# Patient Record
Sex: FEMALE | Employment: FULL TIME | URBAN - METROPOLITAN AREA
[De-identification: names, ages, dates, MRNs, and addresses within clinical notes are randomized per-mention and may not be internally consistent; named-entity substitution may affect disease eponyms.]

---

## 2017-04-05 ENCOUNTER — OFFICE VISIT (OUTPATIENT)
Dept: FAMILY MEDICINE CLINIC | Facility: CLINIC | Age: 62
End: 2017-04-05

## 2017-04-05 VITALS
WEIGHT: 182.19 LBS | HEART RATE: 70 BPM | OXYGEN SATURATION: 97 % | RESPIRATION RATE: 18 BRPM | SYSTOLIC BLOOD PRESSURE: 124 MMHG | DIASTOLIC BLOOD PRESSURE: 86 MMHG | BODY MASS INDEX: 29.99 KG/M2 | TEMPERATURE: 99 F | HEIGHT: 65.25 IN

## 2017-04-05 DIAGNOSIS — H26.132 TOTAL TRAUMATIC CATARACT OF LEFT EYE: ICD-10-CM

## 2017-04-05 DIAGNOSIS — I10 ESSENTIAL HYPERTENSION: ICD-10-CM

## 2017-04-05 DIAGNOSIS — Z01.818 PREOP EXAMINATION: Primary | ICD-10-CM

## 2017-04-05 PROCEDURE — 99243 OFF/OP CNSLTJ NEW/EST LOW 30: CPT | Performed by: FAMILY MEDICINE

## 2017-04-05 NOTE — PROGRESS NOTES
Emmanuel Mills is a 63year old female who presents for preop clearance  Dr. Mirtha Kan reqesting clearance. Left cataract removal   4/17/17  At [facility] in [address]   HPI:   Pt complains of left eye vision changes.   Pt denies any vision  HEENT: denies nasal congestion, sinus pain or ST  LUNGS: denies shortness of breath with exertion  CARDIOVASCULAR: denies chest pain on exertion  GI: denies abdominal pain,denies heartburn  : denies nocturia or changes in stream  MUSCULOSKELETAL:

## 2017-05-18 ENCOUNTER — OFFICE VISIT (OUTPATIENT)
Dept: FAMILY MEDICINE CLINIC | Facility: CLINIC | Age: 62
End: 2017-05-18

## 2017-05-18 VITALS
SYSTOLIC BLOOD PRESSURE: 130 MMHG | RESPIRATION RATE: 16 BRPM | HEIGHT: 65.25 IN | WEIGHT: 184 LBS | OXYGEN SATURATION: 99 % | DIASTOLIC BLOOD PRESSURE: 80 MMHG | HEART RATE: 69 BPM | BODY MASS INDEX: 30.29 KG/M2 | TEMPERATURE: 98 F

## 2017-05-18 DIAGNOSIS — Z13.820 SCREENING FOR OSTEOPOROSIS: ICD-10-CM

## 2017-05-18 DIAGNOSIS — I10 ESSENTIAL HYPERTENSION: ICD-10-CM

## 2017-05-18 DIAGNOSIS — Z01.419 WELL WOMAN EXAM WITH ROUTINE GYNECOLOGICAL EXAM: Primary | ICD-10-CM

## 2017-05-18 DIAGNOSIS — Z12.31 ENCOUNTER FOR SCREENING MAMMOGRAM FOR HIGH-RISK PATIENT: ICD-10-CM

## 2017-05-18 PROCEDURE — 99396 PREV VISIT EST AGE 40-64: CPT | Performed by: FAMILY MEDICINE

## 2017-05-18 RX ORDER — HYDROCHLOROTHIAZIDE 12.5 MG/1
TABLET ORAL
Qty: 180 TABLET | Refills: 1 | Status: SHIPPED | OUTPATIENT
Start: 2017-05-18 | End: 2020-12-16

## 2017-05-18 NOTE — PROGRESS NOTES
HPI:-    Noemi Park is a 63year old female who presents for a complete physical exam.     Wt Readings from Last 6 Encounters:  05/18/17 : 184 lb  04/05/17 : 182 lb 3.2 oz  03/29/16 : 184 lb  05/23/13 : 179 lb 10.8 oz    Body mass index is 30.4 kg/(m^2 OF SYSTEMS:   GENERAL: feels well otherwise  SKIN: denies any unusual skin lesions  EYES:denies blurred vision or double vision  HEENT: denies nasal congestion, sinus pain or ST  LUNGS: denies shortness of breath with exertion  CARDIOVASCULAR: denies chest Metabolic Panel (14); Future    2. Encounter for screening mammogram for high-risk patient    - [facility] NIKI 2D+3D SCREENING BILAT (CPT=77067/11837); Future    3.  Essential hypertension    - hydrochlorothiazide 12.5 MG Oral Tab; TAKE 2 TABLETS(25 MG) BY MOUTH

## 2017-07-26 NOTE — PROGRESS NOTES
HPI:   Rosaura Cervantes is a 63year old female who presents for left ear feeling clogged for  2  weeks. Patient denies ear pain. Left ear is popping. Sounds are not muffled. Denies hearing loss or tinnitus.  Denies sore throat, nasal congestion, cough or col explained to the patient including remote chance of ruptured TM. Verbal consent obtained from the patient. Bilateral ears irrigated with warm water and hydrogen peroxide solution. Cerumen successfully removed.  TM clear and normal. Pt tolerated procedure we

## 2017-07-27 ENCOUNTER — OFFICE VISIT (OUTPATIENT)
Dept: FAMILY MEDICINE CLINIC | Facility: CLINIC | Age: 62
End: 2017-07-27

## 2017-07-27 VITALS
BODY MASS INDEX: 29.96 KG/M2 | SYSTOLIC BLOOD PRESSURE: 118 MMHG | DIASTOLIC BLOOD PRESSURE: 86 MMHG | WEIGHT: 182 LBS | TEMPERATURE: 98 F | RESPIRATION RATE: 18 BRPM | OXYGEN SATURATION: 97 % | HEART RATE: 72 BPM | HEIGHT: 65.25 IN

## 2017-07-27 DIAGNOSIS — H61.23 IMPACTED CERUMEN, BILATERAL: Primary | ICD-10-CM

## 2017-07-27 PROCEDURE — 99213 OFFICE O/P EST LOW 20 MIN: CPT | Performed by: PHYSICIAN ASSISTANT

## 2019-07-30 ENCOUNTER — TELEPHONE (OUTPATIENT)
Dept: FAMILY MEDICINE CLINIC | Facility: CLINIC | Age: 64
End: 2019-07-30

## 2020-12-16 ENCOUNTER — OFFICE VISIT (OUTPATIENT)
Dept: FAMILY MEDICINE CLINIC | Facility: CLINIC | Age: 65
End: 2020-12-16
Payer: COMMERCIAL

## 2020-12-16 VITALS
RESPIRATION RATE: 16 BRPM | SYSTOLIC BLOOD PRESSURE: 158 MMHG | HEART RATE: 125 BPM | HEIGHT: 66 IN | WEIGHT: 190 LBS | TEMPERATURE: 97 F | DIASTOLIC BLOOD PRESSURE: 90 MMHG | BODY MASS INDEX: 30.53 KG/M2 | OXYGEN SATURATION: 98 %

## 2020-12-16 DIAGNOSIS — Z13.820 SCREENING FOR OSTEOPOROSIS: ICD-10-CM

## 2020-12-16 DIAGNOSIS — Z12.31 ENCOUNTER FOR MAMMOGRAM TO ESTABLISH BASELINE MAMMOGRAM: ICD-10-CM

## 2020-12-16 DIAGNOSIS — I10 ESSENTIAL HYPERTENSION: ICD-10-CM

## 2020-12-16 DIAGNOSIS — Z00.00 ANNUAL PHYSICAL EXAM: ICD-10-CM

## 2020-12-16 DIAGNOSIS — Z01.419 WELL WOMAN EXAM WITH ROUTINE GYNECOLOGICAL EXAM: Primary | ICD-10-CM

## 2020-12-16 DIAGNOSIS — H61.23 BILATERAL IMPACTED CERUMEN: ICD-10-CM

## 2020-12-16 DIAGNOSIS — M25.50 ARTHRALGIA, UNSPECIFIED JOINT: ICD-10-CM

## 2020-12-16 DIAGNOSIS — M25.40 JOINT SWELLING: ICD-10-CM

## 2020-12-16 PROCEDURE — 3080F DIAST BP >= 90 MM HG: CPT | Performed by: FAMILY MEDICINE

## 2020-12-16 PROCEDURE — 99396 PREV VISIT EST AGE 40-64: CPT | Performed by: FAMILY MEDICINE

## 2020-12-16 PROCEDURE — 88175 CYTOPATH C/V AUTO FLUID REDO: CPT | Performed by: FAMILY MEDICINE

## 2020-12-16 PROCEDURE — 3077F SYST BP >= 140 MM HG: CPT | Performed by: FAMILY MEDICINE

## 2020-12-16 PROCEDURE — 69210 REMOVE IMPACTED EAR WAX UNI: CPT | Performed by: FAMILY MEDICINE

## 2020-12-16 PROCEDURE — 87624 HPV HI-RISK TYP POOLED RSLT: CPT | Performed by: FAMILY MEDICINE

## 2020-12-16 PROCEDURE — 3008F BODY MASS INDEX DOCD: CPT | Performed by: FAMILY MEDICINE

## 2020-12-16 RX ORDER — TRIAMTERENE AND HYDROCHLOROTHIAZIDE 37.5; 25 MG/1; MG/1
1 CAPSULE ORAL EVERY MORNING
Qty: 30 CAPSULE | Refills: 0 | Status: SHIPPED | OUTPATIENT
Start: 2020-12-16 | End: 2021-01-19

## 2020-12-16 RX ORDER — PREDNISONE 1 MG/1
TABLET ORAL
COMMUNITY
Start: 2020-11-10

## 2020-12-16 NOTE — PROGRESS NOTES
HPI:-    Nallely Valdez is a 60year old female who presents for a complete physical exam.     Wt Readings from Last 6 Encounters:  12/16/20 : 190 lb (86.2 kg)  07/27/17 : 182 lb (82.6 kg)  05/18/17 : 184 lb (83.5 kg)  04/05/17 : 182 lb 3.2 oz (82.6 kg)  0 Social History:   Social History    Tobacco Use      Smoking status: Former Smoker        Quit date: 3/29/1988        Years since quittin.7    Alcohol use:  Yes      Alcohol/week: 0.0 standard drinks    Drug use: No    Occ:  Single Children: 2  Insur genitalia - no inguinal LAD, no lesions. Speculum exam- introitus is normal,scant discharge,cervix is pink.  Bimanual exam- no adnexal masses or tenderness  ((RECTAL:good rectal tone,no mass, brown stool, stool is OB negative)) deferred c-scope 7/2020  MUS morning. Dispense: 30 capsule; Refill: 0    8. Bilateral impacted cerumen  S/p removal .      Discussed diet, exercise,calcium, vitamin D, fish oil and self breast exams.    Questions answered and patient indicates understanding of these issues and agrees

## 2020-12-21 ENCOUNTER — LABORATORY ENCOUNTER (OUTPATIENT)
Dept: LAB | Age: 65
End: 2020-12-21
Attending: FAMILY MEDICINE
Payer: COMMERCIAL

## 2020-12-21 DIAGNOSIS — Z00.00 ANNUAL PHYSICAL EXAM: ICD-10-CM

## 2020-12-21 DIAGNOSIS — M25.50 ARTHRALGIA, UNSPECIFIED JOINT: ICD-10-CM

## 2020-12-21 PROCEDURE — 82607 VITAMIN B-12: CPT

## 2020-12-21 PROCEDURE — 36415 COLL VENOUS BLD VENIPUNCTURE: CPT

## 2020-12-21 PROCEDURE — 83036 HEMOGLOBIN GLYCOSYLATED A1C: CPT

## 2020-12-21 PROCEDURE — 84481 FREE ASSAY (FT-3): CPT

## 2020-12-21 PROCEDURE — 84439 ASSAY OF FREE THYROXINE: CPT

## 2020-12-21 PROCEDURE — 80053 COMPREHEN METABOLIC PANEL: CPT

## 2020-12-21 PROCEDURE — 82306 VITAMIN D 25 HYDROXY: CPT

## 2020-12-21 PROCEDURE — 86431 RHEUMATOID FACTOR QUANT: CPT

## 2020-12-21 PROCEDURE — 80061 LIPID PANEL: CPT

## 2020-12-21 PROCEDURE — 85652 RBC SED RATE AUTOMATED: CPT

## 2020-12-21 PROCEDURE — 84443 ASSAY THYROID STIM HORMONE: CPT

## 2020-12-21 PROCEDURE — 86038 ANTINUCLEAR ANTIBODIES: CPT

## 2020-12-21 PROCEDURE — 86140 C-REACTIVE PROTEIN: CPT

## 2020-12-21 PROCEDURE — 85025 COMPLETE CBC W/AUTO DIFF WBC: CPT

## 2021-01-19 ENCOUNTER — OFFICE VISIT (OUTPATIENT)
Dept: FAMILY MEDICINE CLINIC | Facility: CLINIC | Age: 66
End: 2021-01-19
Payer: COMMERCIAL

## 2021-01-19 VITALS
OXYGEN SATURATION: 98 % | HEART RATE: 82 BPM | WEIGHT: 190 LBS | TEMPERATURE: 98 F | DIASTOLIC BLOOD PRESSURE: 82 MMHG | HEIGHT: 66 IN | RESPIRATION RATE: 18 BRPM | SYSTOLIC BLOOD PRESSURE: 128 MMHG | BODY MASS INDEX: 30.53 KG/M2

## 2021-01-19 DIAGNOSIS — M25.40 JOINT SWELLING: ICD-10-CM

## 2021-01-19 DIAGNOSIS — D72.819 LEUKOPENIA, UNSPECIFIED TYPE: ICD-10-CM

## 2021-01-19 DIAGNOSIS — I10 ESSENTIAL HYPERTENSION: ICD-10-CM

## 2021-01-19 DIAGNOSIS — E55.9 VITAMIN D DEFICIENCY: ICD-10-CM

## 2021-01-19 DIAGNOSIS — R73.9 HYPERGLYCEMIA: Primary | ICD-10-CM

## 2021-01-19 PROCEDURE — 3074F SYST BP LT 130 MM HG: CPT | Performed by: FAMILY MEDICINE

## 2021-01-19 PROCEDURE — 99214 OFFICE O/P EST MOD 30 MIN: CPT | Performed by: FAMILY MEDICINE

## 2021-01-19 PROCEDURE — 3079F DIAST BP 80-89 MM HG: CPT | Performed by: FAMILY MEDICINE

## 2021-01-19 PROCEDURE — 3008F BODY MASS INDEX DOCD: CPT | Performed by: FAMILY MEDICINE

## 2021-01-19 RX ORDER — TRIAMTERENE AND HYDROCHLOROTHIAZIDE 37.5; 25 MG/1; MG/1
1 CAPSULE ORAL EVERY MORNING
Qty: 30 CAPSULE | Refills: 0 | Status: CANCELLED | OUTPATIENT
Start: 2021-01-19

## 2021-01-19 RX ORDER — TRIAMTERENE AND HYDROCHLOROTHIAZIDE 37.5; 25 MG/1; MG/1
1 CAPSULE ORAL EVERY MORNING
Qty: 90 CAPSULE | Refills: 1 | Status: SHIPPED | OUTPATIENT
Start: 2021-01-19 | End: 2021-07-23

## 2021-01-19 NOTE — PROGRESS NOTES
HPI:-    Evelina Lindsay is a 63year old female who presents for lab follow up     S/p gi eval for f/u on UC / has been on steroids for colitis but it did help her joint   Pt will see rheum at [facility] in February   Pt was placed on prednisone in October and aga Carbon Dioxide, Total      21.0 - 32.0 mmol/L 28.0  28.0   ANION GAP      0 - 18 mmol/L 10     BUN      7 - 18 mg/dL 26 (H)  17   CREATININE      0.55 - 1.02 mg/dL 1.31 (H)  0.94   BUN/CREAT Ratio      10.0 - 20.0 19.8     CALCIUM      8.5 - 10.1 mg/dL 9 Count      150.0 - 450.0 10(3)uL    MCV      80.0 - 100.0 fL    MCH      26.0 - 34.0 pg    MCHC      31.0 - 37.0 g/dL    RDW      11.0 - 15.0 %    RDW-SD      35.1 - 46.3 fL    Prelim Neutrophil Abs      1.50 - 7.70 x10 (3) uL    Neutrophils Absolute AVERAGE GLUCOSE      68 - 126 mg/dL    T4,Free (Direct)      0.8 - 1.7 ng/dL    TSH      0.358 - 3.740 mIU/mL    Vitamin D, 25OH, Total      30.0 - 100.0 ng/mL    T3 FREE      2.40 - 4.20 pg/mL    RHEUMATOID FACTOR      <15 IU/mL    C-REACTIVE PROTEIN or itching   MUSCULOSKELETAL: denies back pain  NEURO: denies headaches  PSYCHE: denies depression or anxiety  HEMATOLOGIC: denies hx of anemia  ENDOCRINE: denies thyroid history  ALL/ASTHMA: denies asthma    EXAM:   /82   Pulse 82   Temp 98.1 °F (36

## 2021-01-19 NOTE — TELEPHONE ENCOUNTER
[facility]am[facility] [facility] [address] [facility] [place] previously

## 2021-01-19 NOTE — TELEPHONE ENCOUNTER
12/16/2020 last OV and new Rx prescribed:   I left message for patient to get update:  6. Essential hypertension 7. Joint swelling   - Triamterene-HCTZ 37.5-25 MG Oral Cap; Take 1 capsule by mouth every morning. Dispense: 30 capsule;  Refill: 0    Approve/deny:

## 2021-07-23 ENCOUNTER — TELEPHONE (OUTPATIENT)
Dept: FAMILY MEDICINE CLINIC | Facility: CLINIC | Age: 66
End: 2021-07-23

## 2021-07-23 DIAGNOSIS — I10 ESSENTIAL HYPERTENSION: ICD-10-CM

## 2021-07-23 DIAGNOSIS — M25.40 JOINT SWELLING: ICD-10-CM

## 2021-07-23 RX ORDER — TRIAMTERENE AND HYDROCHLOROTHIAZIDE 37.5; 25 MG/1; MG/1
1 CAPSULE ORAL EVERY MORNING
Qty: 90 CAPSULE | Refills: 0 | Status: SHIPPED | OUTPATIENT
Start: 2021-07-23 | End: 2021-10-20

## 2021-07-23 NOTE — TELEPHONE ENCOUNTER
Pt requested her triamterene from pharmacy on 7/19 - pt aware we did not receive request.  Pls fill asap to [address]

## 2021-10-19 RX ORDER — FOLIC ACID 1 MG/1
TABLET ORAL
COMMUNITY
Start: 2021-02-26

## 2021-10-20 ENCOUNTER — OFFICE VISIT (OUTPATIENT)
Dept: FAMILY MEDICINE CLINIC | Facility: CLINIC | Age: 66
End: 2021-10-20
Payer: COMMERCIAL

## 2021-10-20 VITALS
SYSTOLIC BLOOD PRESSURE: 112 MMHG | HEIGHT: 66 IN | RESPIRATION RATE: 16 BRPM | HEART RATE: 116 BPM | OXYGEN SATURATION: 99 % | WEIGHT: 190 LBS | DIASTOLIC BLOOD PRESSURE: 84 MMHG | BODY MASS INDEX: 30.53 KG/M2

## 2021-10-20 DIAGNOSIS — M25.40 JOINT SWELLING: ICD-10-CM

## 2021-10-20 DIAGNOSIS — I10 ESSENTIAL HYPERTENSION: Primary | ICD-10-CM

## 2021-10-20 DIAGNOSIS — E55.9 VITAMIN D DEFICIENCY: ICD-10-CM

## 2021-10-20 DIAGNOSIS — Z12.31 ENCOUNTER FOR SCREENING MAMMOGRAM FOR HIGH-RISK PATIENT: ICD-10-CM

## 2021-10-20 DIAGNOSIS — R73.9 HYPERGLYCEMIA: ICD-10-CM

## 2021-10-20 PROCEDURE — 3074F SYST BP LT 130 MM HG: CPT | Performed by: FAMILY MEDICINE

## 2021-10-20 PROCEDURE — 99214 OFFICE O/P EST MOD 30 MIN: CPT | Performed by: FAMILY MEDICINE

## 2021-10-20 PROCEDURE — 3008F BODY MASS INDEX DOCD: CPT | Performed by: FAMILY MEDICINE

## 2021-10-20 PROCEDURE — 3079F DIAST BP 80-89 MM HG: CPT | Performed by: FAMILY MEDICINE

## 2021-10-20 RX ORDER — LATANOPROST 50 UG/ML
1 SOLUTION/ DROPS OPHTHALMIC NIGHTLY
COMMUNITY
Start: 2021-08-25

## 2021-10-20 RX ORDER — TRIAMTERENE AND HYDROCHLOROTHIAZIDE 37.5; 25 MG/1; MG/1
1 CAPSULE ORAL EVERY MORNING
Qty: 90 CAPSULE | Refills: 1 | Status: SHIPPED | OUTPATIENT
Start: 2021-10-20

## 2021-10-20 NOTE — PROGRESS NOTES
HPI:-    Harinder Umaña is a 63year old female who presents for follow up     Pt now treated for RA and feeling so much better  + regular exercise   Not checking bp   Patient denies chest pain, shortness of breath, dizziness, and lightheadedness.  No exert mOsm/kg 287     eGFR NON-AFR.  AMERICAN      >=60 44 (L)     eGFR       >=60 50 (L)     AST (SGOT)      15 - 37 U/L 14 (L)  26   ALT (SGPT)      13 - 56 U/L 21  28   ALKALINE PHOSPHATASE      50 - 130 U/L 90  70   Total Bilirubin      0.1 - 1.00 - 4.00 x10(3) uL    Monocytes Absolute      0.10 - 1.00 x10(3) uL    Eosinophils Absolute      0.00 - 0.70 x10(3) uL    Basophils Absolute      0.00 - 0.20 x10(3) uL    Immature Granulocyte Absolute      0.00 - 1.00 x10(3) uL    Neutrophils %      % SCREEN      Negative        Current Outpatient Medications   Medication Sig Dispense Refill   • methotrexate 2.5 MG Oral Tab Take 25 mg by mouth once a week. • latanoprost 0.005 % Ophthalmic Solution Place 1 drop into both eyes nightly.      • folic aci (86.2 kg)   SpO2 99%   BMI 30.67 kg/m²   Body mass index is 30.67 kg/m².    GENERAL: alert and oriented X 3, well developed, well nourished,in no apparent distress  CARDIO: RRR without murmur  LUNGS: clear to auscultation  NECK: supple,no adenopathy,no thyr

## (undated) NOTE — LETTER
11/23/21        [facility]      Dear Juhi Munson,    [facility] records indicate that you have outstanding lab work and or testing that was ordered for you and has not yet been completed:  Orders Placed This Encounter

## (undated) NOTE — MR AVS SNAPSHOT
[address]  [address] [address]  [phone]               Thank you for choosing us for your health care visit with Jailyn Sanz DO.   We are glad to serve you and happy to provide you with this bautista Imaging:  [facility] NIKI 2D+3D SCREENING BILAT (CPT=77067/97192)    Instructions: To schedule an appointment for your radiology test please call [facility] Scheduling at [phone].        Thursday May 18, 2017     Imaging:  XR DEXA BONE DENSITOMETRY (CP information, go to [email or web address] and click on the Sign Up Now link in the [employer] box. Enter your [employer] Activation Code exactly as it appears below along with your Zip Code and Date of Birth to complete the sign-up process.  If you do

## (undated) NOTE — LETTER
01/15/21        [address]      Dear Baron Lovett,    [address] [facility] records indicate that you have outstanding lab work and or testing that was ordered for you and has not yet been completed:  Orders Placed This Encounter

## (undated) NOTE — MR AVS SNAPSHOT
[address]  [address] [address] [phone]               Thank you for choosing us for your health care visit with Prema Mckenzie DO.   We are glad to serve you and happy to provide you with this bautista Your unique [employer] Access Code: [email or web address]  Expires: 6/4/2017 10:51 AM    If you have questions, you can call [phone] to talk to our [facility] Staff. Remember, [employer] is NOT to be used for urgent needs. For medical emergencies, dial 911.